# Patient Record
Sex: FEMALE | Race: WHITE | NOT HISPANIC OR LATINO | Employment: OTHER | URBAN - NONMETROPOLITAN AREA
[De-identification: names, ages, dates, MRNs, and addresses within clinical notes are randomized per-mention and may not be internally consistent; named-entity substitution may affect disease eponyms.]

---

## 2022-04-06 ENCOUNTER — TELEPHONE (OUTPATIENT)
Dept: UROLOGY | Facility: CLINIC | Age: 77
End: 2022-04-06

## 2022-04-06 NOTE — TELEPHONE ENCOUNTER
Caller: CAMRYN YANES     Relationship to patient: DAUGHTER    Best call back number: [phone]    Patient is needing: PT DAUGHTER IS CALLING TO SEE IF APPT CAN BE MADE WITHOUT MED RECS. PLEASE CALL BACK TO LET HER KNOW. ASKED FOR JEZ IF POSSIBLE.

## 2022-04-19 ENCOUNTER — OFFICE VISIT (OUTPATIENT)
Dept: UROLOGY | Facility: CLINIC | Age: 77
End: 2022-04-19

## 2022-04-19 VITALS
HEART RATE: 64 BPM | SYSTOLIC BLOOD PRESSURE: 175 MMHG | BODY MASS INDEX: 24.29 KG/M2 | WEIGHT: 132 LBS | HEIGHT: 62 IN | TEMPERATURE: 96.2 F | DIASTOLIC BLOOD PRESSURE: 105 MMHG | OXYGEN SATURATION: 97 %

## 2022-04-19 DIAGNOSIS — N39.0 URINARY TRACT INFECTION WITHOUT HEMATURIA, SITE UNSPECIFIED: Primary | ICD-10-CM

## 2022-04-19 LAB
BILIRUB BLD-MCNC: NEGATIVE MG/DL
CLARITY, POC: CLEAR
COLOR UR: YELLOW
EXPIRATION DATE: NORMAL
GLUCOSE UR STRIP-MCNC: NEGATIVE MG/DL
KETONES UR QL: NEGATIVE
LEUKOCYTE EST, POC: NEGATIVE
Lab: NORMAL
NITRITE UR-MCNC: NEGATIVE MG/ML
PH UR: 7 [PH] (ref 5–8)
PROT UR STRIP-MCNC: NEGATIVE MG/DL
RBC # UR STRIP: NEGATIVE /UL
SP GR UR: 1.01 (ref 1–1.03)
UROBILINOGEN UR QL: NORMAL

## 2022-04-19 PROCEDURE — 99204 OFFICE O/P NEW MOD 45 MIN: CPT | Performed by: PHYSICIAN ASSISTANT

## 2022-04-19 PROCEDURE — 81003 URINALYSIS AUTO W/O SCOPE: CPT | Performed by: PHYSICIAN ASSISTANT

## 2022-04-19 RX ORDER — METOPROLOL SUCCINATE 25 MG/1
TABLET, EXTENDED RELEASE ORAL
COMMUNITY

## 2022-04-19 RX ORDER — POTASSIUM CHLORIDE 750 MG/1
TABLET, FILM COATED, EXTENDED RELEASE ORAL
COMMUNITY

## 2022-04-19 RX ORDER — ALBUTEROL SULFATE 90 UG/1
AEROSOL, METERED RESPIRATORY (INHALATION)
COMMUNITY

## 2022-04-19 RX ORDER — ROSUVASTATIN CALCIUM 10 MG/1
TABLET, COATED ORAL
COMMUNITY
Start: 2022-03-28

## 2022-04-19 RX ORDER — CELECOXIB 200 MG/1
CAPSULE ORAL
COMMUNITY

## 2022-04-19 RX ORDER — CLONAZEPAM 0.5 MG/1
TABLET ORAL
COMMUNITY

## 2022-04-19 RX ORDER — MONTELUKAST SODIUM 10 MG/1
TABLET ORAL
COMMUNITY
Start: 2022-03-28

## 2022-04-19 RX ORDER — HYDROCODONE BITARTRATE AND ACETAMINOPHEN 5; 325 MG/1; MG/1
TABLET ORAL
COMMUNITY

## 2022-04-19 RX ORDER — ESTRADIOL 0.1 MG/G
2 CREAM VAGINAL DAILY
Qty: 42.5 G | Refills: 12 | Status: SHIPPED | OUTPATIENT
Start: 2022-04-19 | End: 2022-05-24 | Stop reason: SDUPTHER

## 2022-04-19 RX ORDER — ESTRADIOL 10 UG/1
INSERT VAGINAL
COMMUNITY
End: 2022-04-19

## 2022-04-19 RX ORDER — OLMESARTAN MEDOXOMIL 40 MG/1
TABLET ORAL
COMMUNITY

## 2022-04-19 RX ORDER — ESOMEPRAZOLE MAGNESIUM 40 MG/1
CAPSULE, DELAYED RELEASE ORAL
COMMUNITY

## 2022-04-19 RX ORDER — HYDROCHLOROTHIAZIDE 25 MG/1
TABLET ORAL
COMMUNITY
Start: 2022-03-10

## 2022-04-19 NOTE — PROGRESS NOTES
Chief Complaint  Frequent UTI    Referring Provider:  Akbar Eddy*    HPI  Ms. Ontiveros is a 76 y.o. female who presents as a referral for multiple UTIs.  Has a history of hysterectomy s/p bladder sling 10 years ago. Most recently had a UTI successfully treated with a course of cefuroxime.  She reports approximately 4 infections in the last 6 months.    Her UTI symptoms include burning with urination, urgency  Patient Denies current Sx  Her symptoms resolve promptly when antibiotics are initiated for an episode thought to be an infection.    No History of inpatient hospitalized for these infections?    Yes Records of positive urine cultures?  No relationship with the onset of these infections and sexual activity?  No use of barrier contraception or a spermicidal products?   No ongoing problems with constipation?     No urinary incontinence?            2-3 glasses of water per day         Denies history of gross hematuria?   Yes       vaginal dryness?     Past Medical History  No past medical history on file.    Past Surgical History  No past surgical history on file.    Medications    Current Outpatient Medications:   •  hydroCHLOROthiazide (HYDRODIURIL) 25 MG tablet, hydrochlorothiazide 25 mg tablet  TAKE ONE TABLET BY MOUTH ONCE DAILY, Disp: , Rfl:   •  albuterol sulfate HFA (Ventolin HFA) 108 (90 Base) MCG/ACT inhaler, Ventolin HFA 90 mcg/actuation aerosol inhaler  inhale TWO puffs FOUR TIMES DAILY AS NEEDED SHAKE WELL BEFORE EACH DOSE, Disp: , Rfl:   •  celecoxib (CeleBREX) 200 MG capsule, celecoxib 200 mg capsule  TAKE ONE CAPSULE BY MOUTH TWICE DAILY, Disp: , Rfl:   •  clonazePAM (KlonoPIN) 0.5 MG tablet, clonazepam 0.5 mg tablet  TAKE ONE TABLET BY MOUTH AT BEDTIME AS NEEDED MUST LAST 30 DAYS PER MD, Disp: , Rfl:   •  esomeprazole (nexIUM) 40 MG capsule, esomeprazole magnesium 40 mg capsule,delayed release  TAKE ONE CAPSULE BY MOUTH ONCE DAILY 30 TO 60 MINUTES BEFORE A MEAL TO REPLACE OMEPRAZOLE,  "Disp: , Rfl:   •  estradiol (VAGIFEM) 10 MCG tablet vaginal tablet, estradiol 10 mcg vaginal tablet  insert ONE TABLET intravaginally AT BEDTIME each night FOR TWO WEEKS THEN insert ONE TABLET twice A WEEK AS DIRECTED, Disp: , Rfl:   •  HYDROcodone-acetaminophen (NORCO) 5-325 MG per tablet, hydrocodone 5 mg-acetaminophen 325 mg tablet  TAKE ONE TABLET BY MOUTH TWICE DAILY, Disp: , Rfl:   •  metoprolol succinate XL (TOPROL-XL) 25 MG 24 hr tablet, metoprolol succinate ER 25 mg tablet,extended release 24 hr  TAKE ONE TABLET BY MOUTH ONCE DAILY, Disp: , Rfl:   •  montelukast (SINGULAIR) 10 MG tablet, , Disp: , Rfl:   •  olmesartan (BENICAR) 40 MG tablet, olmesartan 40 mg tablet  TAKE ONE TABLET BY MOUTH ONCE DAILY TO REPLACE LOSARTAN, Disp: , Rfl:   •  potassium chloride 10 MEQ CR tablet, potassium chloride ER 10 mEq tablet,extended release  TAKE THREE TABLETS BY MOUTH ONCE DAILY, Disp: , Rfl:   •  rosuvastatin (CRESTOR) 10 MG tablet, , Disp: , Rfl:     Allergies  Not on File    Social History  Social History     Socioeconomic History   • Marital status:    Tobacco Use   • Smoking status: Never Smoker   Vaping Use   • Vaping Use: Never used   Substance and Sexual Activity   • Alcohol use: Never   • Drug use: Defer   • Sexual activity: Defer       Family History  No family history on file.      Physical Exam  Visit Vitals  BP (!) 175/105   Pulse 64   Temp 96.2 °F (35.7 °C)   Ht 157.5 cm (62\")   Wt 59.9 kg (132 lb)   SpO2 97%   BMI 24.14 kg/m²       Labs       Brief Urine Lab Results  (Last result in the past 365 days)      Color   Clarity   Blood   Leuk Est   Nitrite   Protein   CREAT   Urine HCG        04/19/22 1125 Yellow   Clear   Negative   Negative   Negative   Negative                   Post-Void Residual  A post-void residual was measured by ultrasonic bladder scanner by staff. [facility]      Assessment  Ms. Ontiveros is a 76 y.o. female who presents with recurrent UTI.    The patient's risk factors to developing " recurrent UTIs include low water intake, GSM.  She otherwise has no apparent risk factors.  I have reviewed the records sent with the patient and no urine cultures are available.  Her urine today is benign.  We will further evaluate her recurrent UTI by evaluating for the presence of stones.    Plan  1. Encourage fluid consumption, goal of 8 glasses/day, ideally water  2. As she is post-menopausal, start topical vaginal estrogen cream.  3. CT stone    Follow-up in 1 month   9

## 2022-05-24 ENCOUNTER — OFFICE VISIT (OUTPATIENT)
Dept: UROLOGY | Facility: CLINIC | Age: 77
End: 2022-05-24

## 2022-05-24 VITALS
DIASTOLIC BLOOD PRESSURE: 80 MMHG | OXYGEN SATURATION: 96 % | BODY MASS INDEX: 23.92 KG/M2 | HEIGHT: 62 IN | SYSTOLIC BLOOD PRESSURE: 130 MMHG | HEART RATE: 65 BPM | WEIGHT: 130 LBS

## 2022-05-24 DIAGNOSIS — N39.0 URINARY TRACT INFECTION WITHOUT HEMATURIA, SITE UNSPECIFIED: Primary | ICD-10-CM

## 2022-05-24 LAB
BILIRUB BLD-MCNC: NEGATIVE MG/DL
CLARITY, POC: ABNORMAL
COLOR UR: ABNORMAL
EXPIRATION DATE: ABNORMAL
GLUCOSE UR STRIP-MCNC: NEGATIVE MG/DL
KETONES UR QL: NEGATIVE
LEUKOCYTE EST, POC: ABNORMAL
Lab: ABNORMAL
NITRITE UR-MCNC: NEGATIVE MG/ML
PH UR: 7 [PH] (ref 5–8)
PROT UR STRIP-MCNC: NEGATIVE MG/DL
RBC # UR STRIP: NEGATIVE /UL
SP GR UR: 1.01 (ref 1–1.03)
UROBILINOGEN UR QL: NORMAL

## 2022-05-24 PROCEDURE — 81003 URINALYSIS AUTO W/O SCOPE: CPT | Performed by: PHYSICIAN ASSISTANT

## 2022-05-24 PROCEDURE — 99213 OFFICE O/P EST LOW 20 MIN: CPT | Performed by: PHYSICIAN ASSISTANT

## 2022-05-24 RX ORDER — ESTRADIOL 10 UG/1
INSERT VAGINAL
COMMUNITY
Start: 2022-05-10

## 2022-05-24 NOTE — PROGRESS NOTES
Chief Complaint   Patient presents with   • Follow-up     Follow up on CT scan.        HPI  Ms. Ontiveros is a 76 y.o. female with history of recurrent UTI and GSM who presents for follow up.     At this visit, had a CT scan obtained prior to evaluate for further etiology of recurrent UTI.  She had to stop vaginal Estrace as it caused swelling and discomfort in the vulva.  Notably, she had no rash or skin changes on the leg or thigh.  She had no vaginal bleeding.  She has had no breakthrough UTI.    Past Medical History:   Diagnosis Date   • GERD (gastroesophageal reflux disease)    • Hyperlipemia    • Hypertension    • Urinary tract infection        Past Surgical History:   Procedure Laterality Date   • BLADDER SURGERY     • HYSTERECTOMY           Current Outpatient Medications:   •  albuterol sulfate  (90 Base) MCG/ACT inhaler, Ventolin HFA 90 mcg/actuation aerosol inhaler  inhale TWO puffs FOUR TIMES DAILY AS NEEDED SHAKE WELL BEFORE EACH DOSE, Disp: , Rfl:   •  celecoxib (CeleBREX) 200 MG capsule, celecoxib 200 mg capsule  TAKE ONE CAPSULE BY MOUTH TWICE DAILY, Disp: , Rfl:   •  clonazePAM (KlonoPIN) 0.5 MG tablet, clonazepam 0.5 mg tablet  TAKE ONE TABLET BY MOUTH AT BEDTIME AS NEEDED MUST LAST 30 DAYS PER MD, Disp: , Rfl:   •  esomeprazole (nexIUM) 40 MG capsule, esomeprazole magnesium 40 mg capsule,delayed release  TAKE ONE CAPSULE BY MOUTH ONCE DAILY 30 TO 60 MINUTES BEFORE A MEAL TO REPLACE OMEPRAZOLE, Disp: , Rfl:   •  estradiol (VAGIFEM) 10 MCG tablet vaginal tablet, insert ONE TABLET intravaginally AT BEDTIME each night FOR TWO WEEKS THEN insert ONE TABLET twice A WEEK AS DIRECTED, Disp: , Rfl:   •  hydroCHLOROthiazide (HYDRODIURIL) 25 MG tablet, hydrochlorothiazide 25 mg tablet  TAKE ONE TABLET BY MOUTH ONCE DAILY, Disp: , Rfl:   •  HYDROcodone-acetaminophen (NORCO) 5-325 MG per tablet, hydrocodone 5 mg-acetaminophen 325 mg tablet  TAKE ONE TABLET BY MOUTH TWICE DAILY, Disp: , Rfl:   •  metoprolol  "succinate XL (TOPROL-XL) 25 MG 24 hr tablet, metoprolol succinate ER 25 mg tablet,extended release 24 hr  TAKE ONE TABLET BY MOUTH ONCE DAILY, Disp: , Rfl:   •  montelukast (SINGULAIR) 10 MG tablet, , Disp: , Rfl:   •  olmesartan (BENICAR) 40 MG tablet, olmesartan 40 mg tablet  TAKE ONE TABLET BY MOUTH ONCE DAILY TO REPLACE LOSARTAN, Disp: , Rfl:   •  potassium chloride 10 MEQ CR tablet, potassium chloride ER 10 mEq tablet,extended release  TAKE THREE TABLETS BY MOUTH ONCE DAILY, Disp: , Rfl:   •  rosuvastatin (CRESTOR) 10 MG tablet, , Disp: , Rfl:      Physical Exam  Visit Vitals  /80 (BP Location: Left arm, Patient Position: Sitting, Cuff Size: Adult)   Pulse 65   Ht 157.5 cm (62\")   Wt 59 kg (130 lb)   SpO2 96%   BMI 23.78 kg/m²       Labs  Brief Urine Lab Results  (Last result in the past 365 days)      Color   Clarity   Blood   Leuk Est   Nitrite   Protein   CREAT   Urine HCG        05/24/22 1116 Straw   Cloudy   Negative   Small (1+)   Negative   Negative                   Assessment  76 y.o. female with history of recurrent UTI and GSM.  She had a CT scan obtained at an outside hospital and brings that disc and results with her today.  The radiologist found no acute findings in the abdomen and pelvis, specifically no nephrolithiasis or ureterolithiasis, the bladder was normal.  Her UA is benign today.  She has had no breakthrough UTIs since complete treatment of her last UTI and starting vaginal Estrace.    Plan  1.  Follow-up with gynecology, as anticipated, to further discuss GSM  2.  Continue excellent fluid intake with goal of 64 ounces per day, ideally water  3.  Manage any constipation, as needed, with goal of BM daily    FU in 6 months or sooner as needed  "